# Patient Record
Sex: MALE | Race: OTHER | NOT HISPANIC OR LATINO | URBAN - METROPOLITAN AREA
[De-identification: names, ages, dates, MRNs, and addresses within clinical notes are randomized per-mention and may not be internally consistent; named-entity substitution may affect disease eponyms.]

---

## 2020-09-21 ENCOUNTER — OFFICE VISIT (OUTPATIENT)
Dept: URBAN - METROPOLITAN AREA CLINIC 12 | Facility: CLINIC | Age: 25
End: 2020-09-21

## 2020-09-25 ENCOUNTER — OFFICE VISIT (OUTPATIENT)
Dept: URBAN - METROPOLITAN AREA CLINIC 82 | Facility: CLINIC | Age: 25
End: 2020-09-25
Payer: COMMERCIAL

## 2020-09-25 VITALS
SYSTOLIC BLOOD PRESSURE: 134 MMHG | DIASTOLIC BLOOD PRESSURE: 84 MMHG | RESPIRATION RATE: 14 BRPM | HEART RATE: 89 BPM | BODY MASS INDEX: 27.43 KG/M2 | TEMPERATURE: 98.21 F | WEIGHT: 207 LBS | HEIGHT: 73 IN

## 2020-09-25 DIAGNOSIS — B18.2 CHRONIC HEPATITIS C WITHOUT HEPATIC COMA: ICD-10-CM

## 2020-09-25 DIAGNOSIS — K80.20 GALLSTONES: ICD-10-CM

## 2020-09-25 DIAGNOSIS — F11.10 HEROIN ABUSE: ICD-10-CM

## 2020-09-25 PROBLEM — 5602001: Status: ACTIVE | Noted: 2020-09-25

## 2020-09-25 PROCEDURE — 99203 OFFICE O/P NEW LOW 30 MIN: CPT | Performed by: INTERNAL MEDICINE

## 2020-09-25 PROCEDURE — G9906 PT RECV TBCO CESS INTERV: HCPCS | Performed by: INTERNAL MEDICINE

## 2020-09-25 PROCEDURE — G8427 DOCREV CUR MEDS BY ELIG CLIN: HCPCS | Performed by: INTERNAL MEDICINE

## 2020-09-25 PROCEDURE — G8420 CALC BMI NORM PARAMETERS: HCPCS | Performed by: INTERNAL MEDICINE

## 2020-09-25 NOTE — HPI-OTHER HISTORIES
Case of 26 y/o  man with chronic hepatitis C diagnosed on 2014 untreated given active heroine use. Prior work-up shows immunity for hep A no for B. Will request HIV, hep B surface antigen, core antibody and Hep C viral load. Patient is unwilling to stop using drug right now. Prior sonogram with gallstones 7mm. No clinical signs of CLD. He did not seek for profesional help or durg user meetings.

## 2020-09-28 LAB
A/G RATIO: 1.1
AFP, SERUM: 1.1
AFP-L3%, SERUM: (no result)
ALBUMIN: 4.6
ALKALINE PHOSPHATASE: 173
ALT (SGPT): 45
AST (SGOT): 32
BILIRUBIN, TOTAL: 0.3
BUN/CREATININE RATIO: 15
BUN: 13
CALCIUM: 10.5
CARBON DIOXIDE, TOTAL: 25
CHLORIDE: 102
CREATININE: 0.85
EGFR IF AFRICN AM: 140
EGFR IF NONAFRICN AM: 121
GLOBULIN, TOTAL: 4.3
GLUCOSE: 126
HBSAG SCREEN: NEGATIVE
HCV LOG10: 2.43
HEMATOCRIT: 49
HEMOGLOBIN: 15.4
HEP B CORE AB, TOT: NEGATIVE
HEPATITIS B SURF AB QUANT: <3.1
HEPATITIS C QUANTITATION: 270
HIV SCREEN 4TH GENERATION WRFX: NON REACTIVE
Lab: (no result)
MCH: 25.9
MCHC: 31.4
MCV: 83
NRBC: (no result)
PLATELETS: 138
POTASSIUM: 5.7
PROTEIN, TOTAL: 8.9
RBC: 5.94
RDW: 14.1
SODIUM: 146
TEST INFORMATION:: (no result)
WBC: 10.4

## 2020-10-19 ENCOUNTER — OFFICE VISIT (OUTPATIENT)
Dept: URBAN - METROPOLITAN AREA CLINIC 12 | Facility: CLINIC | Age: 25
End: 2020-10-19

## 2020-11-23 ENCOUNTER — LAB OUTSIDE AN ENCOUNTER (OUTPATIENT)
Dept: URBAN - METROPOLITAN AREA CLINIC 82 | Facility: CLINIC | Age: 25
End: 2020-11-23

## 2020-11-23 ENCOUNTER — CLAIMS CREATED FROM THE CLAIM WINDOW (OUTPATIENT)
Dept: URBAN - METROPOLITAN AREA CLINIC 81 | Facility: CLINIC | Age: 25
End: 2020-11-23
Payer: COMMERCIAL

## 2020-11-23 ENCOUNTER — OFFICE VISIT (OUTPATIENT)
Dept: URBAN - METROPOLITAN AREA CLINIC 82 | Facility: CLINIC | Age: 25
End: 2020-11-23
Payer: COMMERCIAL

## 2020-11-23 VITALS
DIASTOLIC BLOOD PRESSURE: 84 MMHG | SYSTOLIC BLOOD PRESSURE: 127 MMHG | TEMPERATURE: 97.7 F | RESPIRATION RATE: 16 BRPM | BODY MASS INDEX: 28.36 KG/M2 | HEART RATE: 81 BPM | WEIGHT: 214 LBS | HEIGHT: 73 IN

## 2020-11-23 DIAGNOSIS — B18.2 CARRIER OF VIRAL HEPATITIS C: ICD-10-CM

## 2020-11-23 DIAGNOSIS — B18.2 CHRONIC HEPATITIS C VIRUS GENOTYPE 1A INFECTION: ICD-10-CM

## 2020-11-23 DIAGNOSIS — Z23 ENCOUNTER FOR IMMUNIZATION: ICD-10-CM

## 2020-11-23 DIAGNOSIS — F11.10 HEROIN ABUSE: ICD-10-CM

## 2020-11-23 PROCEDURE — 99213 OFFICE O/P EST LOW 20 MIN: CPT | Performed by: INTERNAL MEDICINE

## 2020-11-23 PROCEDURE — G8483 FLU IMM NO ADMIN DOC REA: HCPCS | Performed by: INTERNAL MEDICINE

## 2020-11-23 PROCEDURE — G8420 CALC BMI NORM PARAMETERS: HCPCS | Performed by: INTERNAL MEDICINE

## 2020-11-23 PROCEDURE — 4004F PT TOBACCO SCREEN RCVD TLK: CPT | Performed by: INTERNAL MEDICINE

## 2020-11-23 PROCEDURE — 90471 IMMUNIZATION ADMIN: CPT | Performed by: INTERNAL MEDICINE

## 2020-11-23 PROCEDURE — G8427 DOCREV CUR MEDS BY ELIG CLIN: HCPCS | Performed by: INTERNAL MEDICINE

## 2020-11-23 PROCEDURE — 90746 HEPB VACCINE 3 DOSE ADULT IM: CPT | Performed by: INTERNAL MEDICINE

## 2020-11-23 RX ORDER — VELPATASVIR AND SOFOSBUVIR 100; 400 MG/1; MG/1
1 TABLET TABLET, FILM COATED ORAL ONCE A DAY
Qty: 90 TABLET | Refills: 0 | OUTPATIENT
Start: 2020-11-23 | End: 2021-02-21

## 2020-11-23 NOTE — HPI-OTHER HISTORIES
26 y/o Man with Chornic Hep C genptype 1 A tx no cirrhotic. Preserve synthetic function,normal kidney function. Will treat him with epclusa x 12 weeks.Hep B shot given today, next one in 1 month

## 2020-11-25 ENCOUNTER — TELEPHONE ENCOUNTER (OUTPATIENT)
Dept: URBAN - METROPOLITAN AREA CLINIC 82 | Facility: CLINIC | Age: 25
End: 2020-11-25

## 2020-12-03 ENCOUNTER — TELEPHONE ENCOUNTER (OUTPATIENT)
Dept: URBAN - METROPOLITAN AREA CLINIC 114 | Facility: CLINIC | Age: 25
End: 2020-12-03

## 2020-12-03 RX ORDER — VELPATASVIR AND SOFOSBUVIR 100; 400 MG/1; MG/1
1 TABLET TABLET, FILM COATED ORAL ONCE A DAY
Qty: 30 | Refills: 2 | OUTPATIENT
Start: 2020-12-03 | End: 2021-03-03

## 2020-12-07 ENCOUNTER — TELEPHONE ENCOUNTER (OUTPATIENT)
Dept: URBAN - METROPOLITAN AREA CLINIC 82 | Facility: CLINIC | Age: 25
End: 2020-12-07

## 2020-12-23 ENCOUNTER — OFFICE VISIT (OUTPATIENT)
Dept: URBAN - METROPOLITAN AREA CLINIC 81 | Facility: CLINIC | Age: 25
End: 2020-12-23
Payer: COMMERCIAL

## 2020-12-23 DIAGNOSIS — Z23 ENCOUNTER FOR IMMUNIZATION: ICD-10-CM

## 2020-12-23 PROCEDURE — 90471 IMMUNIZATION ADMIN: CPT | Performed by: INTERNAL MEDICINE

## 2020-12-23 PROCEDURE — 90746 HEPB VACCINE 3 DOSE ADULT IM: CPT | Performed by: INTERNAL MEDICINE

## 2020-12-31 ENCOUNTER — TELEPHONE ENCOUNTER (OUTPATIENT)
Dept: URBAN - METROPOLITAN AREA CLINIC 82 | Facility: CLINIC | Age: 25
End: 2020-12-31

## 2021-01-11 ENCOUNTER — TELEPHONE ENCOUNTER (OUTPATIENT)
Dept: URBAN - METROPOLITAN AREA CLINIC 82 | Facility: CLINIC | Age: 26
End: 2021-01-11

## 2021-01-15 ENCOUNTER — TELEPHONE ENCOUNTER (OUTPATIENT)
Dept: URBAN - METROPOLITAN AREA CLINIC 82 | Facility: CLINIC | Age: 26
End: 2021-01-15

## 2021-01-15 RX ORDER — VELPATASVIR AND SOFOSBUVIR 100; 400 MG/1; MG/1
1 TABLET TABLET, FILM COATED ORAL ONCE A DAY
Qty: 30 TABLET | Refills: 1 | OUTPATIENT
Start: 2021-01-15 | End: 2021-03-16

## 2021-03-31 ENCOUNTER — TELEPHONE ENCOUNTER (OUTPATIENT)
Dept: URBAN - METROPOLITAN AREA CLINIC 82 | Facility: CLINIC | Age: 26
End: 2021-03-31

## 2021-03-31 RX ORDER — VELPATASVIR AND SOFOSBUVIR 100; 400 MG/1; MG/1
1 TABLET TABLET, FILM COATED ORAL ONCE A DAY
Qty: 30 | Refills: 1
Start: 2021-01-15 | End: 2021-06-06

## 2021-05-24 ENCOUNTER — DASHBOARD ENCOUNTERS (OUTPATIENT)
Age: 26
End: 2021-05-24

## 2021-05-24 ENCOUNTER — OFFICE VISIT (OUTPATIENT)
Dept: URBAN - METROPOLITAN AREA CLINIC 82 | Facility: CLINIC | Age: 26
End: 2021-05-24
Payer: COMMERCIAL

## 2021-05-24 ENCOUNTER — OFFICE VISIT (OUTPATIENT)
Dept: URBAN - METROPOLITAN AREA CLINIC 82 | Facility: CLINIC | Age: 26
End: 2021-05-24

## 2021-05-24 VITALS
HEART RATE: 80 BPM | BODY MASS INDEX: 25.53 KG/M2 | DIASTOLIC BLOOD PRESSURE: 75 MMHG | SYSTOLIC BLOOD PRESSURE: 109 MMHG | WEIGHT: 192.6 LBS | TEMPERATURE: 97.6 F | HEIGHT: 73 IN

## 2021-05-24 DIAGNOSIS — B18.2 CHRONIC HEPATITIS C VIRUS GENOTYPE 1A INFECTION: ICD-10-CM

## 2021-05-24 DIAGNOSIS — E66.3 OVERWEIGHT (BMI 25.0-29.9): ICD-10-CM

## 2021-05-24 DIAGNOSIS — K80.20 GALLSTONES: ICD-10-CM

## 2021-05-24 PROBLEM — 162863004: Status: ACTIVE | Noted: 2020-09-25

## 2021-05-24 PROBLEM — 768289009: Status: ACTIVE | Noted: 2020-11-23

## 2021-05-24 PROBLEM — 235919008: Status: ACTIVE | Noted: 2020-09-25

## 2021-05-24 PROCEDURE — 99214 OFFICE O/P EST MOD 30 MIN: CPT | Performed by: INTERNAL MEDICINE

## 2021-05-24 RX ORDER — VELPATASVIR AND SOFOSBUVIR 100; 400 MG/1; MG/1
1 TABLET TABLET, FILM COATED ORAL ONCE A DAY
Qty: 30 | Refills: 1 | Status: ACTIVE | COMMUNITY
Start: 2021-01-15 | End: 2021-06-06

## 2021-05-24 NOTE — HPI-TODAY'S VISIT:
Patient completed hep C tx total of 12 weeks with 4 week interuption. Epclusa. Will check hpe C VL today to confirm eradication.

## 2021-05-26 LAB
ALBUMIN: 4.5
ALKALINE PHOSPHATASE: 108
ALT (SGPT): 18
AST (SGOT): 14
BILIRUBIN, DIRECT: 0.11
BILIRUBIN, TOTAL: 0.4
HCV LOG10: (no result)
HEPATITIS C QUANTITATION: (no result)
PROTEIN, TOTAL: 7.6
TEST INFORMATION:: (no result)

## 2021-10-14 ENCOUNTER — TELEPHONE ENCOUNTER (OUTPATIENT)
Dept: URBAN - METROPOLITAN AREA CLINIC 82 | Facility: CLINIC | Age: 26
End: 2021-10-14

## 2021-12-23 ENCOUNTER — OFFICE VISIT (OUTPATIENT)
Dept: URBAN - METROPOLITAN AREA CLINIC 82 | Facility: CLINIC | Age: 26
End: 2021-12-23

## 2022-01-14 ENCOUNTER — OFFICE VISIT (OUTPATIENT)
Dept: URBAN - METROPOLITAN AREA CLINIC 82 | Facility: CLINIC | Age: 27
End: 2022-01-14